# Patient Record
Sex: MALE | Race: WHITE
[De-identification: names, ages, dates, MRNs, and addresses within clinical notes are randomized per-mention and may not be internally consistent; named-entity substitution may affect disease eponyms.]

---

## 2021-06-27 ENCOUNTER — HEALTH MAINTENANCE LETTER (OUTPATIENT)
Age: 51
End: 2021-06-27

## 2021-10-16 ENCOUNTER — HEALTH MAINTENANCE LETTER (OUTPATIENT)
Age: 51
End: 2021-10-16

## 2022-07-23 ENCOUNTER — HEALTH MAINTENANCE LETTER (OUTPATIENT)
Age: 52
End: 2022-07-23

## 2022-10-01 ENCOUNTER — HEALTH MAINTENANCE LETTER (OUTPATIENT)
Age: 52
End: 2022-10-01

## 2023-08-06 ENCOUNTER — HEALTH MAINTENANCE LETTER (OUTPATIENT)
Age: 53
End: 2023-08-06

## 2025-07-09 ENCOUNTER — OFFICE VISIT (OUTPATIENT)
Dept: FAMILY MEDICINE | Facility: CLINIC | Age: 55
End: 2025-07-09
Payer: COMMERCIAL

## 2025-07-09 VITALS
HEART RATE: 76 BPM | WEIGHT: 315 LBS | SYSTOLIC BLOOD PRESSURE: 134 MMHG | TEMPERATURE: 97.2 F | HEIGHT: 67 IN | BODY MASS INDEX: 49.44 KG/M2 | OXYGEN SATURATION: 94 % | RESPIRATION RATE: 18 BRPM | DIASTOLIC BLOOD PRESSURE: 86 MMHG

## 2025-07-09 DIAGNOSIS — H01.006 BLEPHARITIS OF BOTH EYES WITH ROSACEA: Primary | ICD-10-CM

## 2025-07-09 DIAGNOSIS — L71.9 ROSACEA: ICD-10-CM

## 2025-07-09 DIAGNOSIS — H01.003 BLEPHARITIS OF BOTH EYES WITH ROSACEA: Primary | ICD-10-CM

## 2025-07-09 DIAGNOSIS — L71.9 BLEPHARITIS OF BOTH EYES WITH ROSACEA: Primary | ICD-10-CM

## 2025-07-09 PROCEDURE — 1126F AMNT PAIN NOTED NONE PRSNT: CPT | Performed by: INTERNAL MEDICINE

## 2025-07-09 PROCEDURE — 99203 OFFICE O/P NEW LOW 30 MIN: CPT | Performed by: INTERNAL MEDICINE

## 2025-07-09 PROCEDURE — 3075F SYST BP GE 130 - 139MM HG: CPT | Performed by: INTERNAL MEDICINE

## 2025-07-09 PROCEDURE — 3079F DIAST BP 80-89 MM HG: CPT | Performed by: INTERNAL MEDICINE

## 2025-07-09 RX ORDER — BACITRACIN 500 [USP'U]/G
OINTMENT OPHTHALMIC
Qty: 3.5 G | Refills: 0 | Status: SHIPPED | OUTPATIENT
Start: 2025-07-09

## 2025-07-09 RX ORDER — MUPIROCIN 2 %
OINTMENT (GRAM) TOPICAL 3 TIMES DAILY
Qty: 30 G | Refills: 0 | Status: SHIPPED | OUTPATIENT
Start: 2025-07-09

## 2025-07-09 ASSESSMENT — PAIN SCALES - GENERAL: PAINLEVEL_OUTOF10: NO PAIN (0)

## 2025-07-09 ASSESSMENT — ENCOUNTER SYMPTOMS: EYE PAIN: 1

## 2025-07-09 NOTE — PATIENT INSTRUCTIONS
Based on our discussion, I have outlined the following instructions for you:      - Use a warm, damp cloth on your eyes to help with the symptoms. Gently massage your eyelids and wash them with a mix of water and a little salt.  - Apply the prescribed eye ointment (bacitracin or erythromycin) to your eyes before going to bed every night for 2 weeks.  - Use artificial tears to help with dry eyes.  - If your symptoms do not get better, you will need to see an eye specialist for further evaluation.    Thank you again for your visit, and we look forward to supporting you in your journey to better health.

## 2025-07-20 ENCOUNTER — HEALTH MAINTENANCE LETTER (OUTPATIENT)
Age: 55
End: 2025-07-20

## 2025-07-23 ENCOUNTER — OFFICE VISIT (OUTPATIENT)
Dept: DERMATOLOGY | Facility: CLINIC | Age: 55
End: 2025-07-23
Payer: COMMERCIAL

## 2025-07-23 DIAGNOSIS — L71.9 ROSACEA: Primary | ICD-10-CM

## 2025-07-23 RX ORDER — DOXYCYCLINE 100 MG/1
100 CAPSULE ORAL 2 TIMES DAILY
Qty: 180 CAPSULE | Refills: 0 | Status: SHIPPED | OUTPATIENT
Start: 2025-07-23

## 2025-07-23 NOTE — PROGRESS NOTES
[facility] Dermatology Note  Encounter Date: Jul 23, 2025  Office Visit     Reviewed patients past medical history and pertinent chart review prior to patients visit today.     Dermatology Problem List:  1.  Rosacea, metronidazole 0.75% cream and doxycycline 100 mg twice daily x 3 months, 7/23/2025   - Previous: Mupirocin 2% ointment    Personal Hx: no personal history of skin cancer  Family Hx: Brother and mother, history of malignant melanoma    ____________________________________________    CC: Rash (ROSACEA THAT BEING TREATED WITH MUPIROCIN)    HPI:  Mr. Daryl Whitehead is a(n) 55 year old male who presents today as a new patient due to a concern of rosacea.  This has been occurring on and off for the past 6 months.  However, more recently it has become more stable.  The patient notes redness of the cheeks and nose, as well as a few small breakout like lesions.  He was prescribed mupirocin 2% ointment by his primary care provider on 7/9/2025 with no improvement noted.    Patient is otherwise feeling well, without additional skin concerns.    Of note, the patient is following with ophthalmology due to blepharitis.    Medications:  Current Outpatient Medications   Medication Sig Dispense Refill    doxycycline monohydrate (MONODOX) 100 MG capsule Take 1 capsule (100 mg) by mouth 2 times daily. 180 capsule 0    metroNIDAZOLE (METROCREAM) 0.75 % external cream Apply topically 2 times daily. Apply to face. 45 g 5    mupirocin (BACTROBAN) 2 % external ointment Apply topically 3 times daily. 30 g 0    bacitracin 500 UNIT/GM ophthalmic ointment 0.5 INCH APPLY TOPICALLY ONCE DAILY AT BED TIME FOR 2 WEEKS 3.5 g 0     No current facility-administered medications for this visit.      Past Medical History:   There is no problem list on file for this patient.    No past medical history on file.    ____________________________________________     Physical Exam:  Vitals: There were no vitals taken for this  visit.   SKIN: The exam included face.  - Vazquez II.  - Right cheek and nose > left cheek and forehead, erythematous patches with few small papules and pustules    - No other lesions of concern on areas examined.                   _________________________________________    Assessment & Plan:   # Rosacea   - Rosacea is a common, chronic inflammatory condition with a relapsing remitting course.  Patient advised to avoid triggers that aggravate rosacea.  Common triggers were discussed.  Importance of diligent photoprotection also discussed.    I prescribed metronidazole 0.75% cream for the patient to use twice daily.  In addition, I prescribed doxycycline 100 mg twice daily for 3 months.  Potential side effects of gastrointestinal upset and photosensitivity discussed.    Doxycycline 20 mg twice daily may be future consideration, if needed.    The patient will discontinue the mupirocin 2% ointment on his face at this time (was not prescribed for his eye).    Follow-up: 3 months, a full skin cancer screening examination also be completed at that time due to family history of melanoma    The patient will continue to follow-up with ophthalmology regarding blepharitis.    All risks, benefits and alternatives were discussed with patient.  Patient is in agreement and understands the assessment and plan.  All questions were answered.    Johnna Delgadillo PA-C  [facility] Dermatology     No referring provider defined for this encounter. on close of this encounter.

## 2025-07-23 NOTE — LETTER
7/23/2025      Daryl Whitehead  [address] [place] [address] [address] [facility] [address]      Dear Colleague,    Thank you for referring your patient, Daryl Whitehead, to the [facility]. Please see a copy of my visit note below.    [facility] Dermatology Note  Encounter Date: Jul 23, 2025  Office Visit     Reviewed patients past medical history and pertinent chart review prior to patients visit today.     Dermatology Problem List:  1.  Rosacea, metronidazole 0.75% cream and doxycycline 100 mg twice daily x 3 months, 7/23/2025   - Previous: Mupirocin 2% ointment    Personal Hx: no personal history of skin cancer  Family Hx: Brother and mother, history of malignant melanoma    ____________________________________________    CC: Rash (ROSACEA THAT BEING TREATED WITH MUPIROCIN)    HPI:  Mr. Daryl Whitehead is a(n) 55 year old male who presents today as a new patient due to a concern of rosacea.  This has been occurring on and off for the past 6 months.  However, more recently it has become more stable.  The patient notes redness of the cheeks and nose, as well as a few small breakout like lesions.  He was prescribed mupirocin 2% ointment by his primary care provider on 7/9/2025 with no improvement noted.    Patient is otherwise feeling well, without additional skin concerns.    Of note, the patient is following with ophthalmology due to blepharitis.    Medications:  Current Outpatient Medications   Medication Sig Dispense Refill     doxycycline monohydrate (MONODOX) 100 MG capsule Take 1 capsule (100 mg) by mouth 2 times daily. 180 capsule 0     metroNIDAZOLE (METROCREAM) 0.75 % external cream Apply topically 2 times daily. Apply to face. 45 g 5     mupirocin (BACTROBAN) 2 % external ointment Apply topically 3 times daily. 30 g 0     bacitracin 500 UNIT/GM ophthalmic ointment 0.5 INCH APPLY TOPICALLY ONCE DAILY AT BED TIME FOR 2 WEEKS 3.5 g 0     No current facility-administered  medications for this visit.      Past Medical History:   There is no problem list on file for this patient.    No past medical history on file.    ____________________________________________     Physical Exam:  Vitals: There were no vitals taken for this visit.   SKIN: The exam included face.  - Vazquez II.  - Right cheek and nose > left cheek and forehead, erythematous patches with few small papules and pustules    - No other lesions of concern on areas examined.                   _________________________________________    Assessment & Plan:   # Rosacea   - Rosacea is a common, chronic inflammatory condition with a relapsing remitting course.  Patient advised to avoid triggers that aggravate rosacea.  Common triggers were discussed.  Importance of diligent photoprotection also discussed.    I prescribed metronidazole 0.75% cream for the patient to use twice daily.  In addition, I prescribed doxycycline 100 mg twice daily for 3 months.  Potential side effects of gastrointestinal upset and photosensitivity discussed.    Doxycycline 20 mg twice daily may be future consideration, if needed.    The patient will discontinue the mupirocin 2% ointment on his face at this time (was not prescribed for his eye).    Follow-up: 3 months, a full skin cancer screening examination also be completed at that time due to family history of melanoma    The patient will continue to follow-up with ophthalmology regarding blepharitis.    All risks, benefits and alternatives were discussed with patient.  Patient is in agreement and understands the assessment and plan.  All questions were answered.    Johnna Delgadillo PA-C  [facility] Dermatology     No referring provider defined for this encounter. on close of this encounter.    Again, thank you for allowing me to participate in the care of your patient.        Sincerely,        Johnna Delgadillo PA-C    Electronically signed

## 2025-07-24 ENCOUNTER — MYC MEDICAL ADVICE (OUTPATIENT)
Dept: FAMILY MEDICINE | Facility: CLINIC | Age: 55
End: 2025-07-24
Payer: COMMERCIAL

## 2025-07-24 DIAGNOSIS — H01.004 BLEPHARITIS OF UPPER EYELIDS OF BOTH EYES, UNSPECIFIED TYPE: Primary | ICD-10-CM

## 2025-07-24 DIAGNOSIS — H01.001 BLEPHARITIS OF UPPER EYELIDS OF BOTH EYES, UNSPECIFIED TYPE: Primary | ICD-10-CM

## 2025-07-24 RX ORDER — ERYTHROMYCIN 5 MG/G
0.5 OINTMENT OPHTHALMIC AT BEDTIME
Qty: 3.5 G | Refills: 0 | Status: SHIPPED | OUTPATIENT
Start: 2025-07-24

## 2025-07-25 NOTE — PROGRESS NOTES
New prescription sent for erythromycin ophthalmic ointment for suspected blepharitis, to [facility] pharmacy as bacitracin was not available at [facility] as per the message we received today.  Please notify patient.  Patient to update us on his clinical status.  Was he able to  the bacitracin ointment already that was sent on 7 /9, we received the update from the pharmacy today that is not available at [facility]

## 2025-07-28 ENCOUNTER — OFFICE VISIT (OUTPATIENT)
Dept: OPHTHALMOLOGY | Facility: CLINIC | Age: 55
End: 2025-07-28
Attending: INTERNAL MEDICINE
Payer: COMMERCIAL

## 2025-07-28 DIAGNOSIS — H01.003 BLEPHARITIS OF BOTH EYES WITH ROSACEA: ICD-10-CM

## 2025-07-28 DIAGNOSIS — H01.006 BLEPHARITIS OF BOTH EYES WITH ROSACEA: ICD-10-CM

## 2025-07-28 DIAGNOSIS — L71.9 BLEPHARITIS OF BOTH EYES WITH ROSACEA: ICD-10-CM

## 2025-07-28 DIAGNOSIS — H52.4 PRESBYOPIA: ICD-10-CM

## 2025-07-28 DIAGNOSIS — Z01.01 ENCOUNTER FOR EXAMINATION OF EYES AND VISION WITH ABNORMAL FINDINGS: Primary | ICD-10-CM

## 2025-07-28 PROCEDURE — 92002 INTRM OPH EXAM NEW PATIENT: CPT | Performed by: OPHTHALMOLOGY

## 2025-07-28 RX ORDER — NEOMYCIN POLYMYXIN B SULFATES AND DEXAMETHASONE 3.5; 10000; 1 MG/ML; [USP'U]/ML; MG/ML
1 SUSPENSION/ DROPS OPHTHALMIC 4 TIMES DAILY
Qty: 5 ML | Refills: 1 | Status: SHIPPED | OUTPATIENT
Start: 2025-07-28

## 2025-07-28 ASSESSMENT — VISUAL ACUITY
OD_SC: 20/40
OD_PH_SC: 20/25
OS_PH_SC+: -2
OD_SC+: +1
OD_PH_SC+: -2
OS_PH_SC: 20/25
METHOD: SNELLEN - LINEAR
OS_SC+: +2
OS_SC: 20/30

## 2025-07-28 ASSESSMENT — EXTERNAL EXAM - RIGHT EYE: OD_EXAM: ROSACEA

## 2025-08-02 ASSESSMENT — EXTERNAL EXAM - LEFT EYE: OS_EXAM: ROSACEA
